# Patient Record
Sex: MALE | Race: WHITE | Employment: UNEMPLOYED | URBAN - METROPOLITAN AREA
[De-identification: names, ages, dates, MRNs, and addresses within clinical notes are randomized per-mention and may not be internally consistent; named-entity substitution may affect disease eponyms.]

---

## 2017-03-03 ENCOUNTER — OFFICE VISIT (OUTPATIENT)
Dept: FAMILY MEDICINE CLINIC | Age: 47
End: 2017-03-03

## 2017-03-03 VITALS
SYSTOLIC BLOOD PRESSURE: 124 MMHG | WEIGHT: 144 LBS | HEART RATE: 73 BPM | HEIGHT: 67 IN | TEMPERATURE: 97.4 F | BODY MASS INDEX: 22.6 KG/M2 | DIASTOLIC BLOOD PRESSURE: 74 MMHG

## 2017-03-03 DIAGNOSIS — Z48.02 VISIT FOR SUTURE REMOVAL: ICD-10-CM

## 2017-03-03 DIAGNOSIS — S61.219A FINGER LACERATION, INITIAL ENCOUNTER: Primary | ICD-10-CM

## 2017-03-03 DIAGNOSIS — Z23 ENCOUNTER FOR IMMUNIZATION: ICD-10-CM

## 2017-03-03 NOTE — PROGRESS NOTES
Assessment/Plan:    Charlotte Cartagena was seen today for other and immunization/injection. Diagnoses and all orders for this visit:    Finger laceration, initial encounter  -     REFERRAL TO ORTHOPEDIC SURGERY    Visit for suture removal    Encounter for immunization  -     Influenza virus vaccine (QUADRIVALENT PRES FREE SYRINGE) IM 3 years and older        Follow-up Disposition:  Return in about 2 weeks (around 3/17/2017) for any provider other concerns. ELIZABETH Sinclair expressed understanding of this plan. An AVS was printed and given to the patient.      ----------------------------------------------------------------------    Chief Complaint   Patient presents with    Other     stitches removal on finger    Immunization/Injection       History of Present Illness:    14 days ago, he was leaving his house through the garage when the garage door slammed down on his right ring finger slicing off a good chunk of the distal fat pad. He went to the [facility] ER where he was told that the viability of the tissue is in question. They reattached the finger pad and he is presenting here today for suture removal. He thinks that he had a total of 8 sutures that need to be removed. He had updated tetanus he tells me. The finger pad does have intact sensation. The sutures are all crusted over with scabs at this time. He has not soaked them. While I was taking the sutures out, the pt was crying- he grabbed the hand of the woman  Who was with him who later identified herself as his friend. After completing the suture removal and discussing the process for referral to orthopedics, the pt tells me that he needs a mental health referral for anxiety and depression. I did a quick assessment of suicide and homicidal intent and he tells me he has no thoughts of hurting himself and he has no weapons.  Due to the length of time it took to get he scabbed over sutures out, I did not have time to appropriately discuss his depression and anxiety and have told him to go to the ER if he should feel suicidal or homicidal and to f/up with an appt as soon as we can get him one. He said that he agreed to this plan    No past medical history on file. Allergies   Allergen Reactions    Erythromycin Nausea and Vomiting       Social History   Substance Use Topics    Smoking status: Never Smoker    Smokeless tobacco: Not on file    Alcohol use No       No family history on file. Physical Exam:     Visit Vitals    /74 (BP 1 Location: Right arm, BP Patient Position: Sitting)    Pulse 73    Temp 97.4 °F (36.3 °C) (Oral)    Ht 5' 6.5\" (1.689 m)    Wt 144 lb (65.3 kg)    BMI 22.89 kg/m2   pleasant, crying at times- see above note  A&Ox3  WDWN NAD  Respirations normal and non labored  Right hand- her has a semi-Soboba of scabbed over sutures on his ring finger fat pad. The central tissue does joaquim with pressure and he states that he has intact sensation with the pressure. There were 8 visible sutures and I removed them as carefully as possible but they were scabbed over pretty well.  No open areas were present after removing the sutures and he tolerated it well

## 2017-03-03 NOTE — PROGRESS NOTES
Patient seen in discharge to review the AVS. He signed the release form to request his records from [facility]. Patient given FLU vaccine. Patient denied fever and tolerated vaccine well. Patient verbalized understanding of possible side effects from vaccine and when to seek emergency medical treatment. [facility] information sheet given to patient. Patient had no adverse reaction at time of discharge. He will go now to registration to schedule a provider follow-up appointment as well as one with JHONNY Hernandez.  Boris Lawrence RN

## 2017-03-06 ENCOUNTER — TELEPHONE (OUTPATIENT)
Dept: FAMILY MEDICINE CLINIC | Age: 47
End: 2017-03-06

## 2017-03-17 ENCOUNTER — TELEPHONE (OUTPATIENT)
Dept: FAMILY MEDICINE CLINIC | Age: 47
End: 2017-03-17

## 2017-04-11 ENCOUNTER — TELEPHONE (OUTPATIENT)
Dept: FAMILY MEDICINE CLINIC | Age: 47
End: 2017-04-11

## 2017-04-11 NOTE — TELEPHONE ENCOUNTER
Called to pt and phone message left per provider Kate's request. \"Pt was a NO SHOW for appt 3/17/17. Please call to see if he is still interested in receiving care\". Chart review shows calls attempted by  x 2.

## 2017-04-12 ENCOUNTER — TELEPHONE (OUTPATIENT)
Dept: FAMILY MEDICINE CLINIC | Age: 47
End: 2017-04-12

## 2017-04-12 NOTE — TELEPHONE ENCOUNTER
Called listed number and spoke to patient who said he got [address] so he is seeing a doctor through them.   Whitney Boothe

## 2020-08-30 ENCOUNTER — HOSPITAL ENCOUNTER (EMERGENCY)
Age: 50
Discharge: HOME | End: 2020-08-30
Attending: EMERGENCY MEDICINE | Admitting: EMERGENCY MEDICINE
Payer: MEDICAID

## 2020-08-30 VITALS
TEMPERATURE: 97.8 F | HEART RATE: 59 BPM | DIASTOLIC BLOOD PRESSURE: 84 MMHG | SYSTOLIC BLOOD PRESSURE: 124 MMHG | OXYGEN SATURATION: 98 % | RESPIRATION RATE: 18 BRPM

## 2020-08-30 DIAGNOSIS — T15.92XD: Primary | ICD-10-CM

## 2020-08-30 PROCEDURE — 99283 EMERGENCY DEPT VISIT LOW MDM: CPT

## 2020-08-30 PROCEDURE — 90471 IMMUNIZATION ADMIN: CPT

## 2020-08-30 PROCEDURE — 90715 TDAP VACCINE 7 YRS/> IM: CPT | Performed by: EMERGENCY MEDICINE

## 2020-08-30 PROCEDURE — 74011250636 HC RX REV CODE- 250/636: Performed by: EMERGENCY MEDICINE

## 2020-08-30 RX ADMIN — TETANUS TOXOID, REDUCED DIPHTHERIA TOXOID AND ACELLULAR PERTUSSIS VACCINE, ADSORBED 0.5 ML: 5; 2.5; 8; 8; 2.5 SUSPENSION INTRAMUSCULAR at 14:13

## 2020-08-30 NOTE — ED PROVIDER NOTES
This is a 59-year-old male who [facility] a possible [facility] ring in his right eye. The patient was working on a car several days ago and developed a foreign body sensation in the right eye. [facility] and they felt that he had a rust ring and sent him here for evaluation and removal.  He is complaining of discomfort in the right eye but no visual difficulty other than having a hard time holding his eye open. Apparently [facility] never called [facility]. The patient arrived expecting definitive care of this foreign body and rust ring. Past Medical History:   Diagnosis Date    Kidney stone     Spontaneous pneumothorax        Past Surgical History:   Procedure Laterality Date    HX LOBECTOMY      HX PARATHYROIDECTOMY           History reviewed. No pertinent family history.     Social History     Socioeconomic History    Marital status: SINGLE     Spouse name: Not on file    Number of children: Not on file    Years of education: Not on file    Highest education level: Not on file   Occupational History    Not on file   Social Needs    Financial resource strain: Not on file    Food insecurity     Worry: Not on file     Inability: Not on file    Transportation needs     Medical: Not on file     Non-medical: Not on file   Tobacco Use    Smoking status: Never Smoker    Smokeless tobacco: Never Used   Substance and Sexual Activity    Alcohol use: No    Drug use: No    Sexual activity: Not on file   Lifestyle    Physical activity     Days per week: Not on file     Minutes per session: Not on file    Stress: Not on file   Relationships    Social connections     Talks on phone: Not on file     Gets together: Not on file     Attends Tenriism service: Not on file     Active member of club or organization: Not on file     Attends meetings of clubs or organizations: Not on file     Relationship status: Not on file    Intimate partner violence     Fear of current or ex partner: Not on file     Emotionally abused: Not on file     Physically abused: Not on file     Forced sexual activity: Not on file   Other Topics Concern    Not on file   Social History Narrative    Not on file         ALLERGIES: Erythromycin    Review of Systems   Constitutional: Negative for chills, fatigue and fever. HENT: Negative. Negative for congestion. Eyes: Positive for pain and redness. Foreign body right eye as noted in HPI   Respiratory: Negative. Cardiovascular: Negative. Vitals:    08/30/20 1249   BP: 124/84   Pulse: (!) 59   Resp: 18   Temp: 97.8 °F (36.6 °C)   SpO2: 98%            Physical Exam  Vitals signs and nursing note reviewed. Constitutional:       General: He is in acute distress (Moderate distress with pain in the right eye due to FB). Appearance: Normal appearance. He is not ill-appearing. HENT:      Head: Normocephalic and atraumatic. Nose: Nose normal.   Eyes:      General: No scleral icterus. Right eye: No discharge. Extraocular Movements: Extraocular movements intact. Pupils: Pupils are equal, round, and reactive to light. Comments: There is scleral injection of the right eye. There is also what appears to be a metallic foreign body in the cornea near the limbus of the right eye at approximately 10 o'clock position. The vitreous appears clear. No other abnormality is noted acutely. Neck:      Musculoskeletal: Normal range of motion. Cardiovascular:      Rate and Rhythm: Normal rate and regular rhythm. Neurological:      Mental Status: He is alert. Psychiatric:         Mood and Affect: Mood normal.         Behavior: Behavior normal.         Thought Content:  Thought content normal.         Judgment: Judgment normal.          MDM  Number of Diagnoses or Management Options  FB eye, left, subsequent encounter: new and does not require workup     Amount and/or Complexity of Data Reviewed  Decide to obtain previous medical records or to obtain history from someone other than the patient: yes  Review and summarize past medical records: yes  Discuss the patient with other providers: yes    Risk of Complications, Morbidity, and/or Mortality  Presenting problems: moderate  Diagnostic procedures: minimal  Management options: moderate    Patient Progress  Patient progress: stable         Procedures    I have spoken with the ophthalmologist at [facility]. He is at the office currently and will be glad to see this patient. The patient is in agreement and I am discharging him now and he will go directly to the office. 2:13 PM    The plan was to send the patient to [facility] given the fact that he has both what appears to be a metallic foreign body still embedded in the cornea for several days as well as a likely rust ring. It was felt that it was in the best interest of the patient to have definitive care by the ophthalmologist done now.   Patient agreed and he was referred on to [facility] to be seen today upon discharge from the ED by the ophthalmologist.

## 2020-08-30 NOTE — DISCHARGE INSTRUCTIONS
If you go to [facility] right now, the ophthalmologist is there and will be glad to see you and get this foreign body out of your eye. If there is a rust ring left in the area he will be able to take care of that as well.

## 2020-08-30 NOTE — ED TRIAGE NOTES
Patient arrives [facility] for [facility] body removal from eye. Patient reports piece of rust from working on truck is in eye. Reports no loss in vision.

## 2021-05-19 ENCOUNTER — OFFICE VISIT (OUTPATIENT)
Dept: FAMILY MEDICINE CLINIC | Age: 51
End: 2021-05-19
Payer: MEDICAID

## 2021-05-19 VITALS
HEART RATE: 88 BPM | BODY MASS INDEX: 26.33 KG/M2 | DIASTOLIC BLOOD PRESSURE: 70 MMHG | TEMPERATURE: 97.6 F | WEIGHT: 158 LBS | OXYGEN SATURATION: 96 % | SYSTOLIC BLOOD PRESSURE: 122 MMHG | HEIGHT: 65 IN

## 2021-05-19 DIAGNOSIS — R20.0 NUMBNESS AND TINGLING OF RIGHT HAND: Primary | ICD-10-CM

## 2021-05-19 DIAGNOSIS — R20.2 NUMBNESS AND TINGLING OF RIGHT HAND: Primary | ICD-10-CM

## 2021-05-19 PROCEDURE — 99214 OFFICE O/P EST MOD 30 MIN: CPT | Performed by: NURSE PRACTITIONER

## 2021-05-19 RX ORDER — CLONAZEPAM 1 MG/1
0.5 TABLET ORAL
COMMUNITY

## 2021-05-19 NOTE — PROGRESS NOTES
1. Have you been to the ER, urgent care clinic since your last visit? Hospitalized since your last visit? No    2. Have you seen or consulted any other health care providers outside of the [address] [place] [address] since your last visit? Include any pap smears or colon screening.  No      Chief Complaint   Patient presents with    Establish Care    Hand Injury     rt  numbness         Visit Vitals  /70 (BP 1 Location: Left upper arm, BP Patient Position: Sitting, BP Cuff Size: Adult)   Pulse 88   Temp 97.6 °F (36.4 °C) (Temporal)   Ht 5' 5\" (1.651 m)   Wt 158 lb (71.7 kg)   SpO2 96%   BMI 26.29 kg/m²       Pain Scale: 10 - Worst pain ever/10  Pain Location: Hand

## 2021-05-23 NOTE — PROGRESS NOTES
Subjective:     Johnna Plaza is a 45 y.o. male who presents today with the following:  Chief Complaint   Patient presents with    [facility]    Hand Injury     rt  numbness    Foot Pain     rt     HAS PCP in [place]. There is no problem list on file for this patient. Right hand numbness: This is an access visit. Mr. Nancy Ornelas  Was using a reciprocating saw fr or hours cutting down tree stumps  Over the weekend. .  When he stopped his hand continued to be red and pulsating. Clovia Carls He uses his hands for work. Foot pain had his feet up against the wood discomfort from pushing his foot into the tree. Stubbed his toes right foot,       COMPLIANT WITH MEDICATION:    Denies chest pain, dyspnea, palpitations, headache and blurred vision. Blood pressure normotensive. depression screening addressed not at risk    abuse screening addressed denies    learning assessment addressed reviewed nurses notes    fall risk addressed not at risk    HM: addressed PCP in [place]. ROS:  Gen: denies fever, chills, fatigue, weight loss, weight gain  HEENT:denies blurry vision, nasal congestion, sore throat  Resp: denies dypsnea, cough, wheezing  CV: denies chest pain radiating to the jaws or arms, palpitations, lower extremity edema  Abd: denies nausea, vomiting, diarrhea, constipation  Neuro: denies numbness/tingling  Endo: denies polyuria, polydipsia, heat/cold intolerance  Heme: no lymphadenopathy    Allergies   Allergen Reactions    Erythromycin Nausea and Vomiting         Current Outpatient Medications:     clonazePAM (KlonoPIN) 1 mg tablet, Take 0.5 mg by mouth.  As needed, Disp: , Rfl:     Past Medical History:   Diagnosis Date    Kidney stone     Spontaneous pneumothorax        Past Surgical History:   Procedure Laterality Date    HX LOBECTOMY      HX PARATHYROIDECTOMY         Social History     Tobacco Use   Smoking Status Former Smoker   Smokeless Tobacco Never Used       Social History Socioeconomic History    Marital status: SINGLE     Spouse name: Not on file    Number of children: Not on file    Years of education: Not on file    Highest education level: Not on file   Tobacco Use    Smoking status: Former Smoker    Smokeless tobacco: Never Used   Vaping Use    Vaping Use: Never used   Substance and Sexual Activity    Alcohol use: No    Drug use: No     Social Determinants of Health     Financial Resource Strain:     Difficulty of Paying Living Expenses:    Food Insecurity:     Worried About Running Out of Food in the Last Year:     [address] in the Last Year:    Transportation Needs:     Lack of Transportation (Medical):  Lack of Transportation (Non-Medical):    Physical Activity:     Days of Exercise per Week:     Minutes of Exercise per Session:    Stress:     Feeling of Stress :    Social Connections:     Frequency of Communication with Friends and Family:     Frequency of Social Gatherings with Friends and Family:     Attends Cheondoism Services:     Active Member of Clubs or Organizations:     Attends Club or Organization Meetings:     Marital Status:        Family History   Adopted: Yes         Objective:     Visit Vitals  /70 (BP 1 Location: Left upper arm, BP Patient Position: Sitting, BP Cuff Size: Adult)   Pulse 88   Temp 97.6 °F (36.4 °C) (Temporal)   Ht 5' 5\" (1.651 m)   Wt 158 lb (71.7 kg)   SpO2 96%   BMI 26.29 kg/m²     Body mass index is 26.29 kg/m². General: Alert and oriented. No acute distress. Well nourished  HEENT :  Ears:TMs are normal. Canals are clear. Eyes: pupils equal, round, react to light and accommodation. Extra ocular movements intact. Nose: patent. Mouth and throat is clear. MASK  Neck:supple full range of motion no thyromegaly. Trachea midline, No carotid bruits. No significant lymphadenopathy  Lungs[de-identified] clear to auscultation without wheezes, rales, or rhonchi. Heart :RRR, S1 & S2 are normal intensity.  No murmur; no gallop  Abdomen: bowel sounds active. No tenderness, guarding, rebound, masses, hepatic or spleen enlargement  Back: no CVA tenderness. Extremities: without clubbing, cyanosis, or edema, Right hand pulsating. Pulses: radial and femoral pulses are normal  Neuro: HMF intact. Cranial nerves II through XII grossly normal.  Motor: is 5 over 5 and symmetrical.   Deep tendon reflexes: +2 equal  Psych:appropriate behavior, mood, affect and judgement. No results found for this or any previous visit. No results found for this visit on 05/19/21. Assessment/ Plan:     1. Numbness and tingling of right hand    - REFERRAL TO ORTHOPEDICS      Orders Placed This Encounter    REFERRAL TO ORTHOPEDICS     Referral Priority:   Routine     Referral Type:   Consultation     Referral Reason:   Specialty Services Required     Referred to Provider:   Geri Gonzales MD     Requested Specialty:   Orthopedic Surgery     Number of Visits Requested:   1    clonazePAM (KlonoPIN) 1 mg tablet     Sig: Take 0.5 mg by mouth. As needed         Verbal and written instructions (see AVS) provided. Patient expresses understanding of diagnosis and treatment plan.     Health Maintenance Due   Topic Date Due    Hepatitis C Screening  Never done    COVID-19 Vaccine (1) Never done    Lipid Screen  Never done    Shingrix Vaccine Age 50> (1 of 2) Never done    Colorectal Cancer Screening Combo  Never done               CRAIG Alston

## 2022-03-08 ENCOUNTER — TELEPHONE (OUTPATIENT)
Dept: SURGERY | Age: 52
End: 2022-03-08

## 2022-03-09 ENCOUNTER — OFFICE VISIT (OUTPATIENT)
Dept: SURGERY | Age: 52
End: 2022-03-09
Payer: MEDICAID

## 2022-03-09 VITALS
HEIGHT: 66 IN | TEMPERATURE: 98.3 F | SYSTOLIC BLOOD PRESSURE: 137 MMHG | OXYGEN SATURATION: 98 % | RESPIRATION RATE: 18 BRPM | WEIGHT: 178.2 LBS | HEART RATE: 70 BPM | DIASTOLIC BLOOD PRESSURE: 80 MMHG | BODY MASS INDEX: 28.64 KG/M2

## 2022-03-09 DIAGNOSIS — S22.32XA CLOSED FRACTURE OF ONE RIB OF LEFT SIDE, INITIAL ENCOUNTER: Primary | ICD-10-CM

## 2022-03-09 PROCEDURE — 99202 OFFICE O/P NEW SF 15 MIN: CPT | Performed by: THORACIC SURGERY (CARDIOTHORACIC VASCULAR SURGERY)

## 2022-03-09 RX ORDER — CLONAZEPAM 0.5 MG/1
TABLET ORAL
COMMUNITY

## 2022-03-09 NOTE — PROGRESS NOTES
Visit Vitals  /80 (BP 1 Location: Right upper arm, BP Patient Position: Sitting, BP Cuff Size: Adult)   Pulse 70   Temp 98.3 °F (36.8 °C) (Temporal)   Resp 18   Ht 5' 6\" (1.676 m)   Wt 178 lb 3.2 oz (80.8 kg)   SpO2 98%   BMI 28.76 kg/m²     Chief Complaint   Patient presents with    New Patient     left #7 rib fracture   1. Have you been to the ER, urgent care clinic since your last visit? Hospitalized since your last visit? Yes [facility]    2. Have you seen or consulted any other health care providers outside of the [address] [place] [address] since your last visit? Include any pap smears or colon screening.  No

## 2022-03-10 NOTE — PROGRESS NOTES
History and Physical    Althea Lema is a 45 y.o. male who presentsfor rib fracture. HPI   He fell three weeks ago and broke left lateral seventh rir. He seems to have trivial pain, has been doing heaby work and running in a 10K. He for some strange reason was not just told it will heal and it will be ok and take 3-6 months, but was told he can not move or do any activity and was sent to see [facility] for surgical treatment and has now driven 4 hours to see me! . He has no other complaints related to the fx. I have reviewed the CD chest films that he brought to me and do see the fracture     Past Medical History:   Diagnosis Date    Kidney stone     Spontaneous pneumothorax        Past Surgical History:   Procedure Laterality Date    HX LOBECTOMY      HX PARATHYROIDECTOMY         Family History   Adopted: Yes   Family history unknown: Yes       Social History     Socioeconomic History    Marital status: SINGLE     Spouse name: Not on file    Number of children: Not on file    Years of education: Not on file    Highest education level: Not on file   Occupational History    Not on file   Tobacco Use    Smoking status: Former Smoker     Quit date:      Years since quittin.2    Smokeless tobacco: Never Used   Vaping Use    Vaping Use: Never used   Substance and Sexual Activity    Alcohol use: No    Drug use: Yes     Types: Marijuana    Sexual activity: Not on file   Other Topics Concern    Not on file   Social History Narrative    Not on file     Social Determinants of Health     Financial Resource Strain:     Difficulty of Paying Living Expenses: Not on file   Food Insecurity:     Worried About [address] in the Last Year: Not on file    Asaf of Food in the Last Year: Not on file   Transportation Needs:     Lack of Transportation (Medical):  Not on file    Lack of Transportation (Non-Medical): Not on file   Physical Activity:     Days of Exercise per Week: Not on file    Minutes of Exercise per Session: Not on file   Stress:     Feeling of Stress : Not on file   Social Connections:     Frequency of Communication with Friends and Family: Not on file    Frequency of Social Gatherings with Friends and Family: Not on file    Attends Baptism Services: Not on file    Active Member of [address] [place] [address] or Organizations: Not on file    Attends Club or Organization Meetings: Not on file    Marital Status: Not on file   Intimate Partner Violence:     Fear of Current or Ex-Partner: Not on file    Emotionally Abused: Not on file    Physically Abused: Not on file    Sexually Abused: Not on file   Housing Stability:     Unable to Pay for Housing in the Last Year: Not on file    Number of [place] in the Last Year: Not on file    Unstable Housing in the Last Year: Not on file       Erythromycin      Current Outpatient Medications   Medication Sig Dispense Refill    clonazePAM (KlonoPIN) 0.5 mg tablet       clonazePAM (KlonoPIN) 1 mg tablet Take 0.5 mg by mouth. As needed (Patient not taking: Reported on 3/9/2022)         Review of Systems   Constitutional: Negative for chills, diaphoresis, fever, malaise/fatigue and weight loss. No decreased activity   HENT: Negative for congestion, ear pain, hearing loss, nosebleeds and sore throat. Eyes: Negative for blurred vision, double vision and discharge. No visual disturbances  No icterus   Respiratory: Negative for cough, hemoptysis, sputum production, shortness of breath, wheezing and stridor. No cyanosis  No sleep apnea   Cardiovascular: Negative for chest pain, palpitations, orthopnea, claudication, leg swelling and PND.         No bradycardia  No tachycardia  No syncope   Gastrointestinal: Negative for abdominal pain, blood in stool, constipation, diarrhea, heartburn, melena, nausea and vomiting. Genitourinary: Negative for dysuria, flank pain, frequency, hematuria and urgency. No lesions  No uretheral discharge   Musculoskeletal: Negative for back pain, falls, joint pain, myalgias and neck pain. No decreased range of motion   Skin: Negative for itching and rash. No abrasions,   No skin breakdown,   No burns  No dry skin  No petechia,   No skin lesions  No hypertrophic scar,   No keloid scar   Neurological: Negative for dizziness, tingling, tremors, speech change, focal weakness, seizures, loss of consciousness, weakness and headaches. Endo/Heme/Allergies: Negative for polydipsia. Does not bruise/bleed easily. No swollen lymph glands  No excessive thirst,  No heat intolerance  No excessive hunger  Not immuno compromised  No recurrent fevers  No recurrentt infections,    Psychiatric/Behavioral: Negative for depression, hallucinations and suicidal ideas. No suicidal  No Chely        There is no problem list on file for this patient. /80 (BP 1 Location: Right upper arm, BP Patient Position: Sitting, BP Cuff Size: Adult)   Pulse 70   Temp 98.3 °F (36.8 °C) (Temporal)   Resp 18   Ht 5' 6\" (1.676 m)   Wt 178 lb 3.2 oz (80.8 kg)   SpO2 98%   BMI 28.76 kg/m²     Physical Exam  Vitals and nursing note reviewed. Constitutional:       Appearance: Normal appearance. He is well-developed. Comments: Ambulating nromally,   HENT:      Mouth/Throat:      Lips: No lesions. Dentition: Normal dentition. No dental caries. Tongue: No lesions. Tongue does not deviate from midline. Palate: No mass and lesions. Pharynx: No posterior oropharyngeal erythema. Eyes:      General: Lids are normal.      Conjunctiva/sclera: Conjunctivae normal.      Right eye: Right conjunctiva is not injected. Left eye: Left conjunctiva is not injected. Pupils: Pupils are equal, round, and reactive to light.       Comments: No Xanthelasma  No Ptosis  Sclera Non icteric   Neck:      Thyroid: No thyroid mass or thyromegaly. Vascular: No carotid bruit, hepatojugular reflux or JVD. Cardiovascular:      Rate and Rhythm: Normal rate and regular rhythm. Chest Wall: PMI is not displaced. Pulses: Normal pulses. Carotid pulses are 2+ on the right side and 2+ on the left side. Radial pulses are 2+ on the right side and 2+ on the left side. Femoral pulses are 2+ on the right side and 2+ on the left side. Popliteal pulses are 2+ on the right side and 2+ on the left side. Dorsalis pedis pulses are 2+ on the right side and 2+ on the left side. Posterior tibial pulses are 2+ on the right side and 2+ on the left side. Heart sounds: Normal heart sounds. No murmur heard. No friction rub. No gallop. Pulmonary:      Effort: Pulmonary effort is normal. No tachypnea, bradypnea, accessory muscle usage, respiratory distress or retractions. Breath sounds: Normal breath sounds. No stridor. Chest:   Breasts:      Right: No supraclavicular adenopathy. Left: No supraclavicular adenopathy. Abdominal:      General: Bowel sounds are normal. There is no distension. Palpations: Abdomen is soft. There is no hepatomegaly, splenomegaly or mass. Tenderness: There is no abdominal tenderness. Comments: Liver Non Tender, No hepatomegaly  Spleen non tender, no splenomegaly   Musculoskeletal:         General: Tenderness present. Normal range of motion. Comments: Digits and Nails No cyanosis   Lymphadenopathy:      Cervical: No cervical adenopathy. Upper Body:      Right upper body: No supraclavicular adenopathy. Left upper body: No supraclavicular adenopathy. Skin:     Coloration: Skin is not jaundiced. Findings: No abrasion, bruising, ecchymosis, erythema, lesion or petechiae. Nails: There is no clubbing. Neurological:      General: No focal deficit present. Mental Status: He is alert and oriented to person, place, and time. Cranial Nerves: Cranial nerves are intact. Sensory: Sensation is intact. Motor: Motor function is intact. No weakness. Coordination: Coordination is intact. Gait: Gait is intact. Gait normal.      Comments: Normal Stance   Psychiatric:         Attention and Perception: Attention normal.         Mood and Affect: Mood and affect normal.         Speech: Speech normal.         Behavior: Behavior normal.     minor pain over fracture site at left 7hth rib    Problem List Items Addressed This Visit     None          Assessment and Plan:  Isolated single rib fracture, he does not need any limitation on his activity other than if it hurts worse when you do it then don't.   He most certainly does NOT need any operative fixation of this problem   Ruby Drake MD

## 2023-05-22 RX ORDER — CLONAZEPAM 0.5 MG/1
TABLET ORAL
COMMUNITY

## 2023-05-22 RX ORDER — CLONAZEPAM 1 MG/1
0.5 TABLET ORAL
COMMUNITY